# Patient Record
Sex: MALE | Race: BLACK OR AFRICAN AMERICAN | NOT HISPANIC OR LATINO | URBAN - METROPOLITAN AREA
[De-identification: names, ages, dates, MRNs, and addresses within clinical notes are randomized per-mention and may not be internally consistent; named-entity substitution may affect disease eponyms.]

---

## 2019-10-31 ENCOUNTER — EMERGENCY (EMERGENCY)
Facility: HOSPITAL | Age: 44
LOS: 1 days | Discharge: HOME | End: 2019-10-31
Attending: EMERGENCY MEDICINE
Payer: SELF-PAY

## 2019-10-31 VITALS
WEIGHT: 220.02 LBS | SYSTOLIC BLOOD PRESSURE: 166 MMHG | OXYGEN SATURATION: 98 % | DIASTOLIC BLOOD PRESSURE: 94 MMHG | RESPIRATION RATE: 20 BRPM | HEART RATE: 106 BPM | HEIGHT: 71 IN | TEMPERATURE: 98 F

## 2019-10-31 VITALS
DIASTOLIC BLOOD PRESSURE: 90 MMHG | HEART RATE: 74 BPM | RESPIRATION RATE: 16 BRPM | OXYGEN SATURATION: 97 % | TEMPERATURE: 98 F | SYSTOLIC BLOOD PRESSURE: 158 MMHG

## 2019-10-31 PROCEDURE — 99283 EMERGENCY DEPT VISIT LOW MDM: CPT

## 2019-10-31 RX ORDER — OXYCODONE HYDROCHLORIDE 5 MG/1
1 TABLET ORAL
Qty: 12 | Refills: 0
Start: 2019-10-31 | End: 2019-11-02

## 2019-10-31 RX ORDER — ACETAMINOPHEN 500 MG
975 TABLET ORAL ONCE
Refills: 0 | Status: COMPLETED | OUTPATIENT
Start: 2019-10-31 | End: 2019-10-31

## 2019-10-31 RX ORDER — OXYCODONE HYDROCHLORIDE 5 MG/1
5 TABLET ORAL ONCE
Refills: 0 | Status: DISCONTINUED | OUTPATIENT
Start: 2019-10-31 | End: 2019-10-31

## 2019-10-31 RX ORDER — LIDOCAINE 4 G/100G
1 CREAM TOPICAL ONCE
Refills: 0 | Status: COMPLETED | OUTPATIENT
Start: 2019-10-31 | End: 2019-10-31

## 2019-10-31 RX ADMIN — OXYCODONE HYDROCHLORIDE 5 MILLIGRAM(S): 5 TABLET ORAL at 14:39

## 2019-10-31 RX ADMIN — Medication 975 MILLIGRAM(S): at 14:39

## 2019-10-31 RX ADMIN — OXYCODONE HYDROCHLORIDE 5 MILLIGRAM(S): 5 TABLET ORAL at 13:57

## 2019-10-31 RX ADMIN — Medication 975 MILLIGRAM(S): at 13:57

## 2019-10-31 RX ADMIN — LIDOCAINE 1 PATCH: 4 CREAM TOPICAL at 13:57

## 2019-10-31 NOTE — ED PROVIDER NOTE - NSFOLLOWUPINSTRUCTIONS_ED_ALL_ED_FT
1. Follow up with your primary care doctor in the next 2-3 days   2. Rest, stay hydrated. Apply ice in 20 minute intervals several times a day   3. For pain take Alleve 2 tabs 2 times a day with additional Tylenol 1g every 6 hours. For severe pain take Oxycodone 1 tab every 6 hours as needed (do not drive after taking, this medication causes sedation)  4. Use incentive spirometer multiple times per day  to increase breathing. You can apply over the counter Salonpas patches for additional relief if needed   5. Return to ED for any new or worsened symptoms including increased pain, difficulty breathing, weakness, cough, fevers and all other concerns
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
## 2019-10-31 NOTE — ED PROVIDER NOTE - PHYSICAL EXAMINATION
CONSTITUTIONAL: Patient is awake, alert and oriented x 3. Patient is well appearing and in no acute distress.  HEAD: NCAT,   NECK: supple  LUNGS: CTA B/L, There is a large area ecchymosis and overlying ttp over right lateral rib cage   HEART: RRR.+S1S2 no murmurs,   ABDOMEN: Soft nd/nt+bs no rebound or guarding.   EXTREMITY: no edema or calf tenderness b/l, FROM upper and lower ext b/l  NEURO: No focal deficits
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
## 2019-10-31 NOTE — ED PROVIDER NOTE - PATIENT PORTAL LINK FT
You can access the FollowMyHealth Patient Portal offered by [facility] by registering at the following website: [email or web address]. By joining [employer]’s FollowMyHealth portal, you will also be able to view your health information using other applications (apps) compatible with our system.

## 2019-10-31 NOTE — ED PROVIDER NOTE - PROGRESS NOTE DETAILS
pt will try and have his ct rport faxed to ed Pt unable to have images faxed, [place] reach [place]. Discussed w/ pt given CT yesterday w/ read repeating imaging would likely no . Pain improving after meds in ED. Dr. rodrigez aware and agrees does not need repeat scans will d/c w/ incentive spirometer and pain control and dc home   Grace Bo PA-C
